# Patient Record
Sex: MALE | Race: WHITE
[De-identification: names, ages, dates, MRNs, and addresses within clinical notes are randomized per-mention and may not be internally consistent; named-entity substitution may affect disease eponyms.]

---

## 2018-07-06 ENCOUNTER — HOSPITAL ENCOUNTER (INPATIENT)
Dept: HOSPITAL 53 - M MSPAV | Age: 58
LOS: 6 days | Discharge: HOME | DRG: 224 | End: 2018-07-12
Attending: SURGERY | Admitting: SURGERY
Payer: MEDICAID

## 2018-07-06 DIAGNOSIS — J18.9: ICD-10-CM

## 2018-07-06 DIAGNOSIS — K56.51: Primary | ICD-10-CM

## 2018-07-06 DIAGNOSIS — I10: ICD-10-CM

## 2018-07-06 DIAGNOSIS — J43.9: ICD-10-CM

## 2018-07-06 DIAGNOSIS — R91.1: ICD-10-CM

## 2018-07-06 DIAGNOSIS — R04.2: ICD-10-CM

## 2018-07-06 DIAGNOSIS — F10.10: ICD-10-CM

## 2018-07-06 DIAGNOSIS — Z79.899: ICD-10-CM

## 2018-07-06 DIAGNOSIS — F17.200: ICD-10-CM

## 2018-07-06 RX ADMIN — MORPHINE SULFATE 1 MG: 4 INJECTION INTRAVENOUS at 21:04

## 2018-07-06 RX ADMIN — MORPHINE SULFATE 1 MG: 4 INJECTION INTRAVENOUS at 23:33

## 2018-07-06 RX ADMIN — SODIUM CHLORIDE, POTASSIUM CHLORIDE, SODIUM LACTATE AND CALCIUM CHLORIDE 1 MLS/HR: 600; 310; 30; 20 INJECTION, SOLUTION INTRAVENOUS at 21:04

## 2018-07-07 LAB
ANION GAP: 6 MEQ/L (ref 8–16)
BASO #: 0 10^3/UL (ref 0–0.2)
BASO %: 0.4 % (ref 0–1)
BLOOD UREA NITROGEN: 9 MG/DL (ref 7–18)
CALCIUM LEVEL: 8.7 MG/DL (ref 8.5–10.1)
CARBON DIOXIDE LEVEL: 30 MEQ/L (ref 21–32)
CHLORIDE LEVEL: 101 MEQ/L (ref 98–107)
CREATININE FOR GFR: 0.58 MG/DL (ref 0.7–1.3)
EOS #: 0.1 10^3/UL (ref 0–0.5)
EOSINOPHIL NFR BLD AUTO: 1.5 % (ref 0–3)
GFR SERPL CREATININE-BSD FRML MDRD: > 60 ML/MIN/{1.73_M2} (ref 56–?)
GLUCOSE, FASTING: 90 MG/DL (ref 70–100)
HEMATOCRIT: 44.1 % (ref 42–52)
HEMOGLOBIN: 15.2 G/DL (ref 13.5–17.5)
IMMATURE GRANULOCYTE %: 0.1 % (ref 0–3)
LACTIC ACID SEPSIS PROTOCOL: 0.8 MMOL/L (ref 0.4–2)
LYMPH #: 1.3 10^3/UL (ref 1.5–4.5)
LYMPH %: 17.7 % (ref 24–44)
MEAN CORPUSCULAR HEMOGLOBIN: 33.1 PG (ref 27–33)
MEAN CORPUSCULAR HGB CONC: 34.5 G/DL (ref 32–36.5)
MEAN CORPUSCULAR VOLUME: 96.1 FL (ref 80–96)
MONO #: 1 10^3/UL (ref 0–0.8)
MONO %: 13.7 % (ref 0–5)
NEUTROPHILS #: 5.1 10^3/UL (ref 1.8–7.7)
NEUTROPHILS %: 66.6 % (ref 36–66)
NRBC BLD AUTO-RTO: 0 % (ref 0–0)
PLATELET COUNT, AUTOMATED: 210 10^3/UL (ref 150–450)
POTASSIUM SERUM: 4 MEQ/L (ref 3.5–5.1)
RED BLOOD COUNT: 4.59 10^6/UL (ref 4.3–6.1)
RED CELL DISTRIBUTION WIDTH: 12.5 % (ref 11.5–14.5)
SODIUM LEVEL: 137 MEQ/L (ref 136–145)
WHITE BLOOD COUNT: 7.6 10^3/UL (ref 4–10)

## 2018-07-07 RX ADMIN — DEXTROMETHORPHAN 1 MG: 30 SUSPENSION, EXTENDED RELEASE ORAL at 13:45

## 2018-07-07 RX ADMIN — DEXTROSE MONOHYDRATE 1 MG: 50 INJECTION, SOLUTION INTRAVENOUS at 09:22

## 2018-07-07 RX ADMIN — MORPHINE SULFATE 1 MG: 4 INJECTION INTRAVENOUS at 11:38

## 2018-07-07 RX ADMIN — MORPHINE SULFATE 1 MG: 4 INJECTION INTRAVENOUS at 04:50

## 2018-07-07 RX ADMIN — SODIUM CHLORIDE, POTASSIUM CHLORIDE, SODIUM LACTATE AND CALCIUM CHLORIDE 1 MLS/HR: 600; 310; 30; 20 INJECTION, SOLUTION INTRAVENOUS at 20:08

## 2018-07-07 RX ADMIN — HYDROCODONE BITARTRATE AND ACETAMINOPHEN 1 TAB: 5; 325 TABLET ORAL at 12:38

## 2018-07-07 RX ADMIN — DEXTROMETHORPHAN 1 MG: 30 SUSPENSION, EXTENDED RELEASE ORAL at 20:09

## 2018-07-07 RX ADMIN — SODIUM CHLORIDE, POTASSIUM CHLORIDE, SODIUM LACTATE AND CALCIUM CHLORIDE 1 MLS/HR: 600; 310; 30; 20 INJECTION, SOLUTION INTRAVENOUS at 04:49

## 2018-07-07 RX ADMIN — MORPHINE SULFATE 1 MG: 4 INJECTION INTRAVENOUS at 15:59

## 2018-07-07 RX ADMIN — MORPHINE SULFATE 1 MG: 4 INJECTION INTRAVENOUS at 20:09

## 2018-07-07 RX ADMIN — CLONIDINE 1 EA: 0.2 PATCH TRANSDERMAL at 01:53

## 2018-07-07 RX ADMIN — MORPHINE SULFATE 1 MG: 4 INJECTION INTRAVENOUS at 06:57

## 2018-07-07 RX ADMIN — SODIUM CHLORIDE, POTASSIUM CHLORIDE, SODIUM LACTATE AND CALCIUM CHLORIDE 1 MLS/HR: 600; 310; 30; 20 INJECTION, SOLUTION INTRAVENOUS at 14:12

## 2018-07-07 RX ADMIN — BISACODYL 1 MG: 10 SUPPOSITORY RECTAL at 12:37

## 2018-07-07 RX ADMIN — HYDROCODONE BITARTRATE AND ACETAMINOPHEN 1 TAB: 5; 325 TABLET ORAL at 01:52

## 2018-07-07 RX ADMIN — POLYETHYLENE GLYCOL 3350 1 PKT: 17 POWDER, FOR SOLUTION ORAL at 12:37

## 2018-07-07 RX ADMIN — ENOXAPARIN SODIUM 1 MG: 40 INJECTION SUBCUTANEOUS at 09:24

## 2018-07-08 RX ADMIN — HYDROCODONE BITARTRATE AND ACETAMINOPHEN 1 TAB: 5; 325 TABLET ORAL at 20:32

## 2018-07-08 RX ADMIN — DEXTROMETHORPHAN 1 MG: 30 SUSPENSION, EXTENDED RELEASE ORAL at 08:45

## 2018-07-08 RX ADMIN — MORPHINE SULFATE 1 MG: 4 INJECTION INTRAVENOUS at 11:21

## 2018-07-08 RX ADMIN — HYDROCODONE BITARTRATE AND ACETAMINOPHEN 1 TAB: 5; 325 TABLET ORAL at 13:56

## 2018-07-08 RX ADMIN — MORPHINE SULFATE 1 MG: 4 INJECTION INTRAVENOUS at 16:26

## 2018-07-08 RX ADMIN — DEXTROSE MONOHYDRATE 1 MG: 50 INJECTION, SOLUTION INTRAVENOUS at 08:49

## 2018-07-08 RX ADMIN — MORPHINE SULFATE 1 MG: 4 INJECTION INTRAVENOUS at 08:44

## 2018-07-08 RX ADMIN — SODIUM CHLORIDE, POTASSIUM CHLORIDE, SODIUM LACTATE AND CALCIUM CHLORIDE 1 MLS/HR: 600; 310; 30; 20 INJECTION, SOLUTION INTRAVENOUS at 21:48

## 2018-07-08 RX ADMIN — ENOXAPARIN SODIUM 1 MG: 40 INJECTION SUBCUTANEOUS at 08:50

## 2018-07-08 RX ADMIN — SODIUM CHLORIDE, POTASSIUM CHLORIDE, SODIUM LACTATE AND CALCIUM CHLORIDE 1 MLS/HR: 600; 310; 30; 20 INJECTION, SOLUTION INTRAVENOUS at 05:17

## 2018-07-08 RX ADMIN — SODIUM CHLORIDE, POTASSIUM CHLORIDE, SODIUM LACTATE AND CALCIUM CHLORIDE 1 MLS/HR: 600; 310; 30; 20 INJECTION, SOLUTION INTRAVENOUS at 13:48

## 2018-07-08 RX ADMIN — HYDROCODONE BITARTRATE AND ACETAMINOPHEN 1 TAB: 5; 325 TABLET ORAL at 05:17

## 2018-07-08 RX ADMIN — DEXTROMETHORPHAN 1 MG: 30 SUSPENSION, EXTENDED RELEASE ORAL at 20:30

## 2018-07-09 LAB
ANION GAP: 11 MEQ/L (ref 8–16)
BASO #: 0 10^3/UL (ref 0–0.2)
BASO %: 0.4 % (ref 0–1)
BLOOD UREA NITROGEN: 8 MG/DL (ref 7–18)
CALCIUM LEVEL: 8.9 MG/DL (ref 8.5–10.1)
CARBON DIOXIDE LEVEL: 29 MEQ/L (ref 21–32)
CHLORIDE LEVEL: 94 MEQ/L (ref 98–107)
CREATININE FOR GFR: 0.52 MG/DL (ref 0.7–1.3)
EOS #: 0.1 10^3/UL (ref 0–0.5)
EOSINOPHIL NFR BLD AUTO: 1.5 % (ref 0–3)
GFR SERPL CREATININE-BSD FRML MDRD: > 60 ML/MIN/{1.73_M2} (ref 56–?)
GLUCOSE BLDC GLUCOMTR-MCNC: 92 MG/DL (ref 70–105)
GLUCOSE BLDC GLUCOMTR-MCNC: 96 MG/DL (ref 70–105)
GLUCOSE, FASTING: 60 MG/DL (ref 70–100)
HEMATOCRIT: 42.5 % (ref 42–52)
HEMOGLOBIN: 14.9 G/DL (ref 13.5–17.5)
IMMATURE GRANULOCYTE %: 0.1 % (ref 0–3)
LYMPH #: 1 10^3/UL (ref 1.5–4.5)
LYMPH %: 14.7 % (ref 24–44)
MEAN CORPUSCULAR HEMOGLOBIN: 33 PG (ref 27–33)
MEAN CORPUSCULAR HGB CONC: 35.1 G/DL (ref 32–36.5)
MEAN CORPUSCULAR VOLUME: 94.2 FL (ref 80–96)
MONO #: 1.1 10^3/UL (ref 0–0.8)
MONO %: 16.3 % (ref 0–5)
NEUTROPHILS #: 4.5 10^3/UL (ref 1.8–7.7)
NEUTROPHILS %: 67 % (ref 36–66)
NRBC BLD AUTO-RTO: 0 % (ref 0–0)
PLATELET COUNT, AUTOMATED: 226 10^3/UL (ref 150–450)
POTASSIUM SERUM: 3.6 MEQ/L (ref 3.5–5.1)
RED BLOOD COUNT: 4.51 10^6/UL (ref 4.3–6.1)
RED CELL DISTRIBUTION WIDTH: 12.1 % (ref 11.5–14.5)
SODIUM LEVEL: 134 MEQ/L (ref 136–145)
WHITE BLOOD COUNT: 6.7 10^3/UL (ref 4–10)

## 2018-07-09 PROCEDURE — 0DN84ZZ RELEASE SMALL INTESTINE, PERCUTANEOUS ENDOSCOPIC APPROACH: ICD-10-PCS

## 2018-07-09 RX ADMIN — BUPIVACAINE HYDROCHLORIDE 1 ML: 2.5 INJECTION, SOLUTION EPIDURAL; INFILTRATION; INTRACAUDAL at 17:05

## 2018-07-09 RX ADMIN — POTASSIUM CHLORIDE, DEXTROSE MONOHYDRATE AND SODIUM CHLORIDE 1 MLS/HR: 150; 5; 450 INJECTION, SOLUTION INTRAVENOUS at 09:21

## 2018-07-09 RX ADMIN — DEXTROSE MONOHYDRATE 1 MG: 50 INJECTION, SOLUTION INTRAVENOUS at 09:16

## 2018-07-09 RX ADMIN — FENTANYL CITRATE 1 MCG: 50 INJECTION, SOLUTION INTRAMUSCULAR; INTRAVENOUS at 19:02

## 2018-07-09 RX ADMIN — ENOXAPARIN SODIUM 1 MG: 40 INJECTION SUBCUTANEOUS at 10:19

## 2018-07-09 RX ADMIN — FENTANYL CITRATE 1 MCG: 50 INJECTION, SOLUTION INTRAMUSCULAR; INTRAVENOUS at 19:11

## 2018-07-09 RX ADMIN — MORPHINE SULFATE 1 MG: 10 INJECTION INTRAVENOUS at 19:25

## 2018-07-09 RX ADMIN — SODIUM CHLORIDE, POTASSIUM CHLORIDE, SODIUM LACTATE AND CALCIUM CHLORIDE 1 MLS/HR: 600; 310; 30; 20 INJECTION, SOLUTION INTRAVENOUS at 06:11

## 2018-07-09 RX ADMIN — SODIUM CHLORIDE, POTASSIUM CHLORIDE, SODIUM LACTATE AND CALCIUM CHLORIDE 1 MLS/HR: 600; 310; 30; 20 INJECTION, SOLUTION INTRAVENOUS at 02:20

## 2018-07-09 RX ADMIN — HYDROCODONE BITARTRATE AND ACETAMINOPHEN 1 TAB: 5; 325 TABLET ORAL at 06:18

## 2018-07-09 RX ADMIN — POTASSIUM CHLORIDE, DEXTROSE MONOHYDRATE AND SODIUM CHLORIDE 1 MLS/HR: 150; 5; 450 INJECTION, SOLUTION INTRAVENOUS at 20:20

## 2018-07-09 RX ADMIN — DEXTROMETHORPHAN 1 MG: 30 SUSPENSION, EXTENDED RELEASE ORAL at 09:15

## 2018-07-09 RX ADMIN — DEXTROMETHORPHAN 1 MG: 30 SUSPENSION, EXTENDED RELEASE ORAL at 21:37

## 2018-07-09 RX ADMIN — MORPHINE SULFATE 1 MG: 10 INJECTION INTRAVENOUS at 19:35

## 2018-07-09 RX ADMIN — FENTANYL CITRATE 1 MCG: 50 INJECTION, SOLUTION INTRAMUSCULAR; INTRAVENOUS at 18:50

## 2018-07-09 RX ADMIN — HYDROCODONE BITARTRATE AND ACETAMINOPHEN 1 TAB: 5; 325 TABLET ORAL at 11:22

## 2018-07-09 RX ADMIN — SODIUM CHLORIDE, POTASSIUM CHLORIDE, SODIUM LACTATE AND CALCIUM CHLORIDE 1 MLS/HR: 600; 310; 30; 20 INJECTION, SOLUTION INTRAVENOUS at 19:00

## 2018-07-09 RX ADMIN — CEFAZOLIN SODIUM 1 GM: 2 SOLUTION INTRAVENOUS at 16:59

## 2018-07-09 RX ADMIN — LIDOCAINE HYDROCHLORIDE 1 ML: 10 INJECTION, SOLUTION EPIDURAL; INFILTRATION; INTRACAUDAL; PERINEURAL at 17:05

## 2018-07-10 LAB
ANION GAP: 6 MEQ/L (ref 8–16)
BASO #: 0 10^3/UL (ref 0–0.2)
BASO %: 0.2 % (ref 0–1)
BLOOD UREA NITROGEN: 9 MG/DL (ref 7–18)
CALCIUM LEVEL: 8.6 MG/DL (ref 8.5–10.1)
CARBON DIOXIDE LEVEL: 31 MEQ/L (ref 21–32)
CHLORIDE LEVEL: 98 MEQ/L (ref 98–107)
CREATININE FOR GFR: 0.65 MG/DL (ref 0.7–1.3)
EOS #: 0 10^3/UL (ref 0–0.5)
EOSINOPHIL NFR BLD AUTO: 0.2 % (ref 0–3)
GFR SERPL CREATININE-BSD FRML MDRD: > 60 ML/MIN/{1.73_M2} (ref 56–?)
GLUCOSE BLDC GLUCOMTR-MCNC: 117 MG/DL (ref 70–105)
GLUCOSE BLDC GLUCOMTR-MCNC: 135 MG/DL (ref 70–105)
GLUCOSE BLDC GLUCOMTR-MCNC: 154 MG/DL (ref 70–105)
GLUCOSE, FASTING: 151 MG/DL (ref 70–100)
HEMATOCRIT: 43.5 % (ref 42–52)
HEMOGLOBIN: 15.3 G/DL (ref 13.5–17.5)
IMMATURE GRANULOCYTE %: 0.4 % (ref 0–3)
LYMPH #: 0.6 10^3/UL (ref 1.5–4.5)
LYMPH %: 11.7 % (ref 24–44)
MEAN CORPUSCULAR HEMOGLOBIN: 32.8 PG (ref 27–33)
MEAN CORPUSCULAR HGB CONC: 35.2 G/DL (ref 32–36.5)
MEAN CORPUSCULAR VOLUME: 93.1 FL (ref 80–96)
MONO #: 0.7 10^3/UL (ref 0–0.8)
MONO %: 14.2 % (ref 0–5)
NEUTROPHILS #: 3.8 10^3/UL (ref 1.8–7.7)
NEUTROPHILS %: 73.3 % (ref 36–66)
NRBC BLD AUTO-RTO: 0 % (ref 0–0)
PLATELET COUNT, AUTOMATED: 236 10^3/UL (ref 150–450)
POTASSIUM SERUM: 4.5 MEQ/L (ref 3.5–5.1)
RED BLOOD COUNT: 4.67 10^6/UL (ref 4.3–6.1)
RED CELL DISTRIBUTION WIDTH: 11.9 % (ref 11.5–14.5)
SODIUM LEVEL: 135 MEQ/L (ref 136–145)
WHITE BLOOD COUNT: 5.2 10^3/UL (ref 4–10)

## 2018-07-10 RX ADMIN — DEXTROMETHORPHAN 1 MG: 30 SUSPENSION, EXTENDED RELEASE ORAL at 20:23

## 2018-07-10 RX ADMIN — IPRATROPIUM BROMIDE AND ALBUTEROL SULFATE 1 ML: .5; 3 SOLUTION RESPIRATORY (INHALATION) at 15:43

## 2018-07-10 RX ADMIN — HYDROCODONE BITARTRATE AND ACETAMINOPHEN 1 TAB: 5; 325 TABLET ORAL at 20:23

## 2018-07-10 RX ADMIN — DEXTROSE MONOHYDRATE 1 MG: 50 INJECTION, SOLUTION INTRAVENOUS at 09:46

## 2018-07-10 RX ADMIN — HYDROCODONE BITARTRATE AND ACETAMINOPHEN 1 TAB: 5; 325 TABLET ORAL at 10:54

## 2018-07-10 RX ADMIN — ENOXAPARIN SODIUM 1 MG: 40 INJECTION SUBCUTANEOUS at 09:46

## 2018-07-10 RX ADMIN — HYDROCODONE BITARTRATE AND ACETAMINOPHEN 1 TAB: 5; 325 TABLET ORAL at 17:50

## 2018-07-10 RX ADMIN — MORPHINE SULFATE 1 MG: 4 INJECTION INTRAVENOUS at 00:09

## 2018-07-10 RX ADMIN — POTASSIUM CHLORIDE, DEXTROSE MONOHYDRATE AND SODIUM CHLORIDE 1 MLS/HR: 150; 5; 450 INJECTION, SOLUTION INTRAVENOUS at 20:23

## 2018-07-10 RX ADMIN — DEXTROMETHORPHAN 1 MG: 30 SUSPENSION, EXTENDED RELEASE ORAL at 12:19

## 2018-07-10 RX ADMIN — IPRATROPIUM BROMIDE AND ALBUTEROL SULFATE 1 ML: .5; 3 SOLUTION RESPIRATORY (INHALATION) at 20:47

## 2018-07-10 RX ADMIN — POTASSIUM CHLORIDE, DEXTROSE MONOHYDRATE AND SODIUM CHLORIDE 1 MLS/HR: 150; 5; 450 INJECTION, SOLUTION INTRAVENOUS at 05:51

## 2018-07-11 LAB
ANION GAP: 0 MEQ/L (ref 8–16)
BASO #: 0 10^3/UL (ref 0–0.2)
BASO %: 0.3 % (ref 0–1)
BLOOD UREA NITROGEN: 5 MG/DL (ref 7–18)
CALCIUM LEVEL: 8.4 MG/DL (ref 8.5–10.1)
CARBON DIOXIDE LEVEL: 33 MEQ/L (ref 21–32)
CHLORIDE LEVEL: 106 MEQ/L (ref 98–107)
CREATININE FOR GFR: 0.64 MG/DL (ref 0.7–1.3)
EOS #: 0.1 10^3/UL (ref 0–0.5)
EOSINOPHIL NFR BLD AUTO: 2.1 % (ref 0–3)
GFR SERPL CREATININE-BSD FRML MDRD: > 60 ML/MIN/{1.73_M2} (ref 56–?)
GLUCOSE BLDC GLUCOMTR-MCNC: 103 MG/DL (ref 70–105)
GLUCOSE, FASTING: 112 MG/DL (ref 70–100)
HEMATOCRIT: 40.5 % (ref 42–52)
HEMOGLOBIN: 14.1 G/DL (ref 13.5–17.5)
IMMATURE GRANULOCYTE %: 0.3 % (ref 0–3)
LYMPH #: 2.2 10^3/UL (ref 1.5–4.5)
LYMPH %: 32.8 % (ref 24–44)
MEAN CORPUSCULAR HEMOGLOBIN: 32.9 PG (ref 27–33)
MEAN CORPUSCULAR HGB CONC: 34.8 G/DL (ref 32–36.5)
MEAN CORPUSCULAR VOLUME: 94.6 FL (ref 80–96)
MONO #: 1.1 10^3/UL (ref 0–0.8)
MONO %: 16.9 % (ref 0–5)
NEUTROPHILS #: 3.1 10^3/UL (ref 1.8–7.7)
NEUTROPHILS %: 47.6 % (ref 36–66)
NRBC BLD AUTO-RTO: 0 % (ref 0–0)
PLATELET COUNT, AUTOMATED: 259 10^3/UL (ref 150–450)
POTASSIUM SERUM: 4.7 MEQ/L (ref 3.5–5.1)
RED BLOOD COUNT: 4.28 10^6/UL (ref 4.3–6.1)
RED CELL DISTRIBUTION WIDTH: 12.2 % (ref 11.5–14.5)
SODIUM LEVEL: 139 MEQ/L (ref 136–145)
WHITE BLOOD COUNT: 6.6 10^3/UL (ref 4–10)

## 2018-07-11 RX ADMIN — ENOXAPARIN SODIUM 1 MG: 40 INJECTION SUBCUTANEOUS at 08:21

## 2018-07-11 RX ADMIN — MORPHINE SULFATE 1 MG: 4 INJECTION INTRAVENOUS at 00:12

## 2018-07-11 RX ADMIN — NICOTINE 1 PATCH: 21 PATCH, EXTENDED RELEASE TRANSDERMAL at 17:10

## 2018-07-11 RX ADMIN — DEXTROMETHORPHAN 1 MG: 30 SUSPENSION, EXTENDED RELEASE ORAL at 20:56

## 2018-07-11 RX ADMIN — HYDROCODONE BITARTRATE AND ACETAMINOPHEN 1 TAB: 5; 325 TABLET ORAL at 21:37

## 2018-07-11 RX ADMIN — MAGNESIUM HYDROXIDE 1 ML: 400 SUSPENSION ORAL at 08:20

## 2018-07-11 RX ADMIN — LISINOPRIL 1 MG: 10 TABLET ORAL at 20:12

## 2018-07-11 RX ADMIN — DEXTROMETHORPHAN 1 MG: 30 SUSPENSION, EXTENDED RELEASE ORAL at 08:22

## 2018-07-11 RX ADMIN — HYDROCODONE BITARTRATE AND ACETAMINOPHEN 1 TAB: 5; 325 TABLET ORAL at 17:10

## 2018-07-11 RX ADMIN — LISINOPRIL 1 MG: 10 TABLET ORAL at 11:28

## 2018-07-11 RX ADMIN — DEXTROSE MONOHYDRATE 1 MG: 50 INJECTION, SOLUTION INTRAVENOUS at 08:21

## 2018-07-11 RX ADMIN — HYDROCODONE BITARTRATE AND ACETAMINOPHEN 1 TAB: 5; 325 TABLET ORAL at 09:55

## 2018-07-12 LAB
ANION GAP: 7 MEQ/L (ref 8–16)
BASO #: 0 10^3/UL (ref 0–0.2)
BASO %: 0.3 % (ref 0–1)
BLOOD UREA NITROGEN: 9 MG/DL (ref 7–18)
CALCIUM LEVEL: 8.3 MG/DL (ref 8.5–10.1)
CARBON DIOXIDE LEVEL: 30 MEQ/L (ref 21–32)
CHLORIDE LEVEL: 102 MEQ/L (ref 98–107)
CREATININE FOR GFR: 0.59 MG/DL (ref 0.7–1.3)
EOS #: 0.1 10^3/UL (ref 0–0.5)
EOSINOPHIL NFR BLD AUTO: 2.1 % (ref 0–3)
GFR SERPL CREATININE-BSD FRML MDRD: > 60 ML/MIN/{1.73_M2} (ref 56–?)
GLUCOSE, FASTING: 85 MG/DL (ref 70–100)
HEMATOCRIT: 46.1 % (ref 42–52)
HEMOGLOBIN: 15.5 G/DL (ref 13.5–17.5)
IMMATURE GRANULOCYTE %: 0.3 % (ref 0–3)
LYMPH #: 0.8 10^3/UL (ref 1.5–4.5)
LYMPH %: 12.7 % (ref 24–44)
MEAN CORPUSCULAR HEMOGLOBIN: 33 PG (ref 27–33)
MEAN CORPUSCULAR HGB CONC: 33.6 G/DL (ref 32–36.5)
MEAN CORPUSCULAR VOLUME: 98.1 FL (ref 80–96)
MONO #: 0.7 10^3/UL (ref 0–0.8)
MONO %: 10.9 % (ref 0–5)
NEUTROPHILS #: 4.6 10^3/UL (ref 1.8–7.7)
NEUTROPHILS %: 73.7 % (ref 36–66)
NRBC BLD AUTO-RTO: 0 % (ref 0–0)
PLATELET COUNT, AUTOMATED: 255 10^3/UL (ref 150–450)
POTASSIUM SERUM: 3.9 MEQ/L (ref 3.5–5.1)
RED BLOOD COUNT: 4.7 10^6/UL (ref 4.3–6.1)
RED CELL DISTRIBUTION WIDTH: 12.4 % (ref 11.5–14.5)
SODIUM LEVEL: 139 MEQ/L (ref 136–145)
WHITE BLOOD COUNT: 6.2 10^3/UL (ref 4–10)

## 2018-07-12 RX ADMIN — HYDROCODONE BITARTRATE AND ACETAMINOPHEN 1 TAB: 5; 325 TABLET ORAL at 01:44

## 2018-07-12 RX ADMIN — DEXTROMETHORPHAN 1 MG: 30 SUSPENSION, EXTENDED RELEASE ORAL at 08:56

## 2018-07-12 RX ADMIN — LISINOPRIL 1 MG: 20 TABLET ORAL at 08:57

## 2018-07-12 RX ADMIN — ENOXAPARIN SODIUM 1 MG: 40 INJECTION SUBCUTANEOUS at 08:57

## 2018-07-12 RX ADMIN — NICOTINE 1 PATCH: 21 PATCH, EXTENDED RELEASE TRANSDERMAL at 08:56

## 2018-07-31 ENCOUNTER — HOSPITAL ENCOUNTER (OUTPATIENT)
Dept: HOSPITAL 53 - M SDC | Age: 58
Discharge: HOME | End: 2018-07-31
Attending: INTERNAL MEDICINE
Payer: MEDICAID

## 2018-07-31 DIAGNOSIS — J44.9: ICD-10-CM

## 2018-07-31 DIAGNOSIS — I10: ICD-10-CM

## 2018-07-31 DIAGNOSIS — R91.8: ICD-10-CM

## 2018-07-31 DIAGNOSIS — Z79.899: ICD-10-CM

## 2018-07-31 DIAGNOSIS — F17.210: ICD-10-CM

## 2018-07-31 DIAGNOSIS — R04.2: ICD-10-CM

## 2018-07-31 DIAGNOSIS — C34.11: Primary | ICD-10-CM

## 2018-07-31 PROCEDURE — 31623 DX BRONCHOSCOPE/BRUSH: CPT

## 2018-07-31 RX ADMIN — BENZOCAINE, BUTAMBEN, AND TETRACAINE HYDROCHLORIDE 1 SPRAY: .028; .004; .004 AEROSOL, SPRAY TOPICAL at 08:00

## 2018-07-31 RX ADMIN — LIDOCAINE HYDROCHLORIDE 1 ML: 10 INJECTION, SOLUTION INFILTRATION; PERINEURAL at 08:00

## 2018-07-31 RX ADMIN — LIDOCAINE HYDROCHLORIDE 1 ML: 20 SOLUTION ORAL; TOPICAL at 08:00

## 2018-07-31 RX ADMIN — THROMBIN, TOPICAL (BOVINE) 1 UNITS: KIT at 08:00

## 2018-07-31 RX ADMIN — DEXTROSE MONOHYDRATE 1 MG: 50 INJECTION, SOLUTION INTRAVENOUS at 08:00

## 2018-07-31 RX ADMIN — PHENYLEPHRINE HYDROCHLORIDE 1 SPRAY: 0.5 SPRAY NASAL at 08:43

## 2018-08-09 ENCOUNTER — HOSPITAL ENCOUNTER (OUTPATIENT)
Dept: HOSPITAL 53 - M CARPUL | Age: 58
End: 2018-08-09
Attending: INTERNAL MEDICINE
Payer: MEDICAID

## 2018-08-09 DIAGNOSIS — C34.11: Primary | ICD-10-CM

## 2018-08-09 PROCEDURE — 94060 EVALUATION OF WHEEZING: CPT

## 2018-08-21 ENCOUNTER — HOSPITAL ENCOUNTER (OUTPATIENT)
Dept: HOSPITAL 53 - M PLARAD | Age: 58
End: 2018-08-21
Attending: INTERNAL MEDICINE
Payer: COMMERCIAL

## 2018-08-21 DIAGNOSIS — C34.91: Primary | ICD-10-CM

## 2018-08-21 PROCEDURE — 78815 PET IMAGE W/CT SKULL-THIGH: CPT

## 2018-09-05 ENCOUNTER — HOSPITAL ENCOUNTER (OUTPATIENT)
Dept: HOSPITAL 53 - M LAB REF | Age: 58
End: 2018-09-05
Attending: INTERNAL MEDICINE
Payer: COMMERCIAL

## 2018-09-05 DIAGNOSIS — C34.90: Primary | ICD-10-CM

## 2018-09-05 LAB
INR: 0.9
PARTIAL THROMBOPLASTIN TIME: 32.3 SECONDS (ref 25.4–37.6)
PROTHROMBIN TIME: 12.3 SECONDS (ref 12.1–14.4)

## 2018-09-05 PROCEDURE — 85610 PROTHROMBIN TIME: CPT

## 2018-09-12 ENCOUNTER — HOSPITAL ENCOUNTER (OUTPATIENT)
Dept: HOSPITAL 53 - M RAD | Age: 58
End: 2018-09-12
Attending: INTERNAL MEDICINE
Payer: COMMERCIAL

## 2018-09-12 DIAGNOSIS — C34.90: Primary | ICD-10-CM

## 2018-09-14 ENCOUNTER — HOSPITAL ENCOUNTER (OUTPATIENT)
Dept: HOSPITAL 53 - M ONCR | Age: 58
End: 2018-09-14
Attending: RADIOLOGY
Payer: COMMERCIAL

## 2018-09-14 DIAGNOSIS — C34.11: Primary | ICD-10-CM

## 2018-09-14 PROCEDURE — 99201: CPT

## 2018-09-21 ENCOUNTER — HOSPITAL ENCOUNTER (OUTPATIENT)
Dept: HOSPITAL 53 - M SDC | Age: 58
Discharge: HOME | End: 2018-09-21
Attending: THORACIC SURGERY (CARDIOTHORACIC VASCULAR SURGERY)
Payer: COMMERCIAL

## 2018-09-21 DIAGNOSIS — I10: ICD-10-CM

## 2018-09-21 DIAGNOSIS — Z45.2: ICD-10-CM

## 2018-09-21 DIAGNOSIS — Z79.899: ICD-10-CM

## 2018-09-21 DIAGNOSIS — C34.11: Primary | ICD-10-CM

## 2018-09-21 DIAGNOSIS — F17.218: ICD-10-CM

## 2018-09-21 DIAGNOSIS — R04.2: ICD-10-CM

## 2018-09-21 DIAGNOSIS — J44.9: ICD-10-CM

## 2018-09-21 PROCEDURE — 36561 INSERT TUNNELED CV CATH: CPT

## 2018-09-21 RX ADMIN — MUPIROCIN 1 DOSE: 20 OINTMENT TOPICAL at 13:25

## 2018-09-21 RX ADMIN — LIDOCAINE HYDROCHLORIDE 1 ML: 10 INJECTION, SOLUTION INFILTRATION; PERINEURAL at 13:38

## 2018-09-21 RX ADMIN — Medication 1 UNITS: at 13:50

## 2018-09-21 RX ADMIN — SODIUM CHLORIDE 1 UNITS: 4.5 INJECTION, SOLUTION INTRAVENOUS at 13:40

## 2018-09-21 RX ADMIN — BUPIVACAINE 1 ML: 13.3 INJECTION, SUSPENSION, LIPOSOMAL INFILTRATION at 13:58

## 2018-09-26 ENCOUNTER — HOSPITAL ENCOUNTER (OUTPATIENT)
Dept: HOSPITAL 53 - M ONCR | Age: 58
LOS: 4 days | End: 2018-09-30
Attending: RADIOLOGY
Payer: COMMERCIAL

## 2018-09-26 DIAGNOSIS — C34.01: Primary | ICD-10-CM

## 2018-09-26 PROCEDURE — 77334 RADIATION TREATMENT AID(S): CPT

## 2018-09-27 ENCOUNTER — HOSPITAL ENCOUNTER (OUTPATIENT)
Dept: HOSPITAL 53 - M SMT | Age: 58
End: 2018-09-27
Attending: THORACIC SURGERY (CARDIOTHORACIC VASCULAR SURGERY)
Payer: COMMERCIAL

## 2018-09-27 ENCOUNTER — HOSPITAL ENCOUNTER (OUTPATIENT)
Dept: HOSPITAL 53 - M LAB REF | Age: 58
End: 2018-09-27
Attending: PATHOLOGY
Payer: COMMERCIAL

## 2018-09-27 DIAGNOSIS — C34.90: Primary | ICD-10-CM

## 2018-09-27 PROCEDURE — 71046 X-RAY EXAM CHEST 2 VIEWS: CPT

## 2018-09-27 PROCEDURE — 88300 SURGICAL PATH GROSS: CPT

## 2018-09-30 ENCOUNTER — HOSPITAL ENCOUNTER (INPATIENT)
Dept: HOSPITAL 53 - M ICU | Age: 58
LOS: 3 days | Discharge: HOME | DRG: 136 | End: 2018-10-03
Attending: HOSPITALIST | Admitting: HOSPITALIST
Payer: COMMERCIAL

## 2018-09-30 DIAGNOSIS — J44.9: ICD-10-CM

## 2018-09-30 DIAGNOSIS — R78.81: ICD-10-CM

## 2018-09-30 DIAGNOSIS — R04.2: ICD-10-CM

## 2018-09-30 DIAGNOSIS — Z79.899: ICD-10-CM

## 2018-09-30 DIAGNOSIS — I10: ICD-10-CM

## 2018-09-30 DIAGNOSIS — C34.90: Primary | ICD-10-CM

## 2018-09-30 DIAGNOSIS — Z66: ICD-10-CM

## 2018-09-30 DIAGNOSIS — F17.200: ICD-10-CM

## 2018-09-30 LAB
INR: 0.98
PROTHROMBIN TIME: 13.1 SECONDS (ref 12.1–14.4)
TROPONIN I: < 0.02 NG/ML (ref ?–0.1)

## 2018-09-30 RX ADMIN — METHYLPREDNISOLONE SODIUM SUCCINATE 1 MG: 125 INJECTION, POWDER, FOR SOLUTION INTRAMUSCULAR; INTRAVENOUS at 22:33

## 2018-09-30 RX ADMIN — MORPHINE SULFATE 1 MG: 4 INJECTION INTRAVENOUS at 21:41

## 2018-09-30 RX ADMIN — DOCUSATE SODIUM 1 MG: 100 CAPSULE, LIQUID FILLED ORAL at 21:00

## 2018-09-30 RX ADMIN — DEXTROSE MONOHYDRATE 1 MLS/HR: 50 INJECTION, SOLUTION INTRAVENOUS at 22:32

## 2018-09-30 RX ADMIN — SODIUM CHLORIDE 1 MLS/HR: 9 INJECTION, SOLUTION INTRAVENOUS at 21:26

## 2018-09-30 RX ADMIN — IPRATROPIUM BROMIDE AND ALBUTEROL SULFATE 1 ML: .5; 3 SOLUTION RESPIRATORY (INHALATION) at 21:53

## 2018-10-01 LAB
CREATININE FOR GFR: 0.55 MG/DL (ref 0.7–1.3)
GFR SERPL CREATININE-BSD FRML MDRD: > 60 ML/MIN/{1.73_M2} (ref 56–?)
HEMATOCRIT: 39.3 % (ref 42–52)
HEMOGLOBIN: 13.3 G/DL (ref 13.5–17.5)
INR: 0.96
MAGNESIUM LEVEL: 2.2 MG/DL (ref 1.8–2.4)
MEAN CORPUSCULAR HEMOGLOBIN: 32.5 PG (ref 27–33)
MEAN CORPUSCULAR HGB CONC: 33.8 G/DL (ref 32–36.5)
MEAN CORPUSCULAR VOLUME: 96.1 FL (ref 80–96)
NRBC BLD AUTO-RTO: 0 % (ref 0–0)
PLATELET COUNT, AUTOMATED: 342 10^3/UL (ref 150–450)
PROTHROMBIN TIME: 12.9 SECONDS (ref 12.1–14.4)
RED BLOOD COUNT: 4.09 10^6/UL (ref 4.3–6.1)
RED CELL DISTRIBUTION WIDTH: 13.2 % (ref 11.5–14.5)
WHITE BLOOD COUNT: 6 10^3/UL (ref 4–10)

## 2018-10-01 RX ADMIN — IPRATROPIUM BROMIDE AND ALBUTEROL SULFATE 1 ML: .5; 3 SOLUTION RESPIRATORY (INHALATION) at 11:33

## 2018-10-01 RX ADMIN — METHYLPREDNISOLONE SODIUM SUCCINATE 1 MG: 125 INJECTION, POWDER, FOR SOLUTION INTRAMUSCULAR; INTRAVENOUS at 14:24

## 2018-10-01 RX ADMIN — IPRATROPIUM BROMIDE AND ALBUTEROL SULFATE 1 ML: .5; 3 SOLUTION RESPIRATORY (INHALATION) at 00:00

## 2018-10-01 RX ADMIN — DEXTROSE MONOHYDRATE 1 MLS/HR: 50 INJECTION, SOLUTION INTRAVENOUS at 04:02

## 2018-10-01 RX ADMIN — IPRATROPIUM BROMIDE AND ALBUTEROL SULFATE 1 ML: .5; 3 SOLUTION RESPIRATORY (INHALATION) at 20:02

## 2018-10-01 RX ADMIN — DOCUSATE SODIUM 1 MG: 100 CAPSULE, LIQUID FILLED ORAL at 20:08

## 2018-10-01 RX ADMIN — IPRATROPIUM BROMIDE AND ALBUTEROL SULFATE 1 ML: .5; 3 SOLUTION RESPIRATORY (INHALATION) at 08:00

## 2018-10-01 RX ADMIN — IPRATROPIUM BROMIDE AND ALBUTEROL SULFATE 1 ML: .5; 3 SOLUTION RESPIRATORY (INHALATION) at 04:00

## 2018-10-01 RX ADMIN — CEFEPIME HYDROCHLORIDE 1 MLS/HR: 1 INJECTION, POWDER, FOR SOLUTION INTRAMUSCULAR; INTRAVENOUS at 00:09

## 2018-10-01 RX ADMIN — DOCUSATE SODIUM 1 MG: 100 CAPSULE, LIQUID FILLED ORAL at 08:47

## 2018-10-01 RX ADMIN — METHYLPREDNISOLONE SODIUM SUCCINATE 1 MG: 125 INJECTION, POWDER, FOR SOLUTION INTRAMUSCULAR; INTRAVENOUS at 05:29

## 2018-10-01 RX ADMIN — LEVOFLOXACIN 1 MLS/HR: 5 INJECTION, SOLUTION INTRAVENOUS at 10:40

## 2018-10-01 RX ADMIN — IPRATROPIUM BROMIDE AND ALBUTEROL SULFATE 1 ML: .5; 3 SOLUTION RESPIRATORY (INHALATION) at 15:51

## 2018-10-01 RX ADMIN — LISINOPRIL 1 MG: 20 TABLET ORAL at 20:08

## 2018-10-01 RX ADMIN — CEFEPIME HYDROCHLORIDE 1 MLS/HR: 1 INJECTION, POWDER, FOR SOLUTION INTRAMUSCULAR; INTRAVENOUS at 14:24

## 2018-10-01 RX ADMIN — MORPHINE SULFATE 1 MG: 4 INJECTION INTRAVENOUS at 04:16

## 2018-10-01 RX ADMIN — SODIUM CHLORIDE 1 MLS/HR: 9 INJECTION, SOLUTION INTRAVENOUS at 08:45

## 2018-10-01 RX ADMIN — MORPHINE SULFATE 1 MG: 4 INJECTION INTRAVENOUS at 21:14

## 2018-10-02 LAB
HEMATOCRIT: 35.6 % (ref 42–52)
HEMOGLOBIN: 12.2 G/DL (ref 13.5–17.5)
INR: 0.98
MAGNESIUM LEVEL: 2.4 MG/DL (ref 1.8–2.4)
MEAN CORPUSCULAR HEMOGLOBIN: 32.7 PG (ref 27–33)
MEAN CORPUSCULAR HGB CONC: 34.3 G/DL (ref 32–36.5)
MEAN CORPUSCULAR VOLUME: 95.4 FL (ref 80–96)
NRBC BLD AUTO-RTO: 0 % (ref 0–0)
PLATELET COUNT, AUTOMATED: 328 10^3/UL (ref 150–450)
PROTHROMBIN TIME: 13.1 SECONDS (ref 12.1–14.4)
RED BLOOD COUNT: 3.73 10^6/UL (ref 4.3–6.1)
RED CELL DISTRIBUTION WIDTH: 13 % (ref 11.5–14.5)
WHITE BLOOD COUNT: 14.8 10^3/UL (ref 4–10)

## 2018-10-02 RX ADMIN — IPRATROPIUM BROMIDE AND ALBUTEROL SULFATE 1 ML: .5; 3 SOLUTION RESPIRATORY (INHALATION) at 11:15

## 2018-10-02 RX ADMIN — DEXTROSE MONOHYDRATE 1 MLS/HR: 50 INJECTION, SOLUTION INTRAVENOUS at 02:57

## 2018-10-02 RX ADMIN — MORPHINE SULFATE 1 MG: 4 INJECTION INTRAVENOUS at 01:48

## 2018-10-02 RX ADMIN — DOCUSATE SODIUM 1 MG: 100 CAPSULE, LIQUID FILLED ORAL at 08:39

## 2018-10-02 RX ADMIN — IPRATROPIUM BROMIDE AND ALBUTEROL SULFATE 1 ML: .5; 3 SOLUTION RESPIRATORY (INHALATION) at 07:18

## 2018-10-02 RX ADMIN — IPRATROPIUM BROMIDE AND ALBUTEROL SULFATE 1 ML: .5; 3 SOLUTION RESPIRATORY (INHALATION) at 00:00

## 2018-10-02 RX ADMIN — LISINOPRIL 1 MG: 20 TABLET ORAL at 20:23

## 2018-10-02 RX ADMIN — DOCUSATE SODIUM 1 MG: 100 CAPSULE, LIQUID FILLED ORAL at 20:22

## 2018-10-02 RX ADMIN — IPRATROPIUM BROMIDE AND ALBUTEROL SULFATE 1 ML: .5; 3 SOLUTION RESPIRATORY (INHALATION) at 23:46

## 2018-10-02 RX ADMIN — MORPHINE SULFATE 1 MG: 4 INJECTION INTRAVENOUS at 06:21

## 2018-10-02 RX ADMIN — IPRATROPIUM BROMIDE AND ALBUTEROL SULFATE 1 ML: .5; 3 SOLUTION RESPIRATORY (INHALATION) at 19:26

## 2018-10-02 RX ADMIN — IPRATROPIUM BROMIDE AND ALBUTEROL SULFATE 1 ML: .5; 3 SOLUTION RESPIRATORY (INHALATION) at 15:16

## 2018-10-03 LAB
HEMATOCRIT: 40.3 % (ref 42–52)
HEMOGLOBIN: 13.3 G/DL (ref 13.5–17.5)
MAGNESIUM LEVEL: 2.3 MG/DL (ref 1.8–2.4)
MEAN CORPUSCULAR HEMOGLOBIN: 32.2 PG (ref 27–33)
MEAN CORPUSCULAR HGB CONC: 33 G/DL (ref 32–36.5)
MEAN CORPUSCULAR VOLUME: 97.6 FL (ref 80–96)
NRBC BLD AUTO-RTO: 0 % (ref 0–0)
PLATELET COUNT, AUTOMATED: 350 10^3/UL (ref 150–450)
RED BLOOD COUNT: 4.13 10^6/UL (ref 4.3–6.1)
RED CELL DISTRIBUTION WIDTH: 13.2 % (ref 11.5–14.5)
WHITE BLOOD COUNT: 11 10^3/UL (ref 4–10)

## 2018-10-03 RX ADMIN — IPRATROPIUM BROMIDE AND ALBUTEROL SULFATE 1 ML: .5; 3 SOLUTION RESPIRATORY (INHALATION) at 11:28

## 2018-10-03 RX ADMIN — IPRATROPIUM BROMIDE AND ALBUTEROL SULFATE 1 ML: .5; 3 SOLUTION RESPIRATORY (INHALATION) at 07:19

## 2018-10-03 RX ADMIN — IPRATROPIUM BROMIDE AND ALBUTEROL SULFATE 1 ML: .5; 3 SOLUTION RESPIRATORY (INHALATION) at 03:36

## 2018-10-03 RX ADMIN — DOCUSATE SODIUM 1 MG: 100 CAPSULE, LIQUID FILLED ORAL at 08:20

## 2018-10-03 RX ADMIN — DEXTROSE MONOHYDRATE 1 MLS/HR: 50 INJECTION, SOLUTION INTRAVENOUS at 00:25

## 2018-10-04 NOTE — RADONC
RADIATION ONCOLOGY PROGRESS NOTE

 

DATE OF SERVICE:  10/02/2018

 

CHART NUMBER:

.

 

DIAGNOSIS:

Right lung cancer.

 

STAGE:

IIIA, T4N1M0 versus stage IIIB, T4N2M0.

 

ECOG PERFORMANCE STATUS :

1.

 

PROGRESS NOTE:

I apparently received an official consultation request on Mr. Roach, which was

sent to the old fax machine in the [facility].

 

I am well aware of Mr. Roach, and we undertook a full history and physical

consultation on 09/14/2018.

 

Following that, the patient was brought in for CT simulation, which was

undertaken without difficulty.

 

Treatment planning for 3-D conformal therapy had also been completed and

approved.  The insurance authorizations were done.

 

The patient has been brought down today for initiation of radiation and

successfully underwent his first fraction of RT for a dose of 200 cGy.

 

The patient tolerated his first fraction without difficulty.  Radiation is

scheduled to continue on a daily basis.

 

REVIEW OF SYSTEMS:

The patient's review of systems today is unchanged.

 

PHYSICAL EXAMINATION:

The patient's skin clearly shows no evidence of radiation change present, and 
the

remainder of the physical is also unchanged.

 

Thank you for submitting a request for consultation, although I would be happy 
to

refer you to the consultation note done on 09/14/2018.  As noted above, the

patient is well-known to our department and is being seen on a daily basis.

 

If I could be of any further assistance or provide you with any information,

please feel free to contact us at any time.

 

As always, with warm regards.

 

Morales JORDAN

## 2018-10-10 NOTE — RADONC
RADIATION ONCOLOGY PROGRESS NOTE

 

DATE:  10/08/2018

 

CHART #:  

 

Mr. Roach is presently at a dose of 1000 cGy to his right lung and is tolerating

treatments quite well at this point with no complaints related to his radiation

therapy.  He is having no increased difficulty swallowing or breathing.

 

REVIEW OF SYSTEMS:

The patient's review of systems is noncontributory.  Denies nausea, vomiting,

fevers, chills, night sweats, diplopia, headaches, anxiety or depression,

anorexia, weight loss, visual disturbances, chest pain, urinary or bowel

difficulties, bone pain, or neurological problems.

 

PHYSICAL EXAMINATION:

The patient's skin is in good condition with no evidence of radiation change

present.  The remainder of his physical exam remains unchanged as well.

 

Mr. Roach is tolerating treatments quite well and radiation will continue as

scheduled.

## 2018-10-16 NOTE — RADONC
RADIATION ONCOLOGY PROGRESS NOTE

 

DATE:  10/15/2018

 

CHART NUMBER:  

 

Mr. Roach is presently at a dose of 2000 cGy to his right lung and is complaining

of pain upon swallowing that is keeping him up at night.  Other than that, he is

having no problems with his radiation.

 

The patient's review of systems is positive for pain and difficulty sleeping but

is otherwise noncontributory.  Denies nausea, vomiting, fevers, chills, night

sweats, diplopia, headaches, anxiety or depression, anorexia, weight loss, visual

disturbances, chest pain, urinary or bowel difficulties, bone pain, or

neurological problems.

 

PHYSICAL EXAMINATION

The patient's skin is in excellent condition with no evidence of moist or dry

desquamation.  The remainder of his physical exam remains unchanged.

 

Mr. Roach is tolerating treatments quite well and radiation will continue as

scheduled.  I have renewed his Percocet prescription for 5 mg / 325, I have given

him 40 pills.  The patient says he is using them just at night to sleep and this

should last him through the end of treatment.

## 2018-10-23 NOTE — RADONC
RADIATION ONCOLOGY PROGRESS NOTE

 

DATE: 10/22/2018

 

CHART NUMBER: 

 

Mr. Roach is presently at dose of 3000 cGy to his right lung and continues to

complain of pain requesting more pain pills.  He tells me he is only using one at

night, perhaps occasionally one in the morning to help him sleep.  He has no

other complaints at this time related to his radiation therapy or disease other

than some nauseousness and loss of appetite.

 

PHYSICAL EXAMINATION:

The patient's skin is in excellent condition.  His weight has actually increased

total of 3 pounds since consultation.  The remainder of his physical exam remains

unchanged.

 

I explained to the patient that I have already given him 60 Percocets in the past

2-1/2 weeks.  He therefore is taking more than just one at night.  I do not wish

him to be in pain and therefore I have renewed his Percocets and given him 60

pills.  I let him know that expected this last throughout the course of

treatment.

 

Radiation will continue as scheduled.

## 2018-10-30 NOTE — RADONC
RADIATION ONCOLOGY PROGRESS NOTE

 

DATE:  10/29/2018

 

CHART NUMBER:  

 

Mr. Roach, with a diagnosis of right lung carcinoma, is currently receiving local

regional radiotherapy and is tolerating his therapy reasonably well at a dose of

4000 cGy of an anticipated 6800 cGy.

 

REVIEW OF SYSTEMS

He denies any nausea, vomiting, but does experience some minor odynophagia and

dysphagia.  His energy level is slightly diminished but he is still able to

maintain most day-to-day activities without any alteration of his lifestyle.  He

has tried swallowing yogurt to ameliorate the minor dysphagia with no relief.

 

EXAMINATION FINDINGS:

Skin within the irradiated volume shows neither erythema nor desquamation.  There

is no palpable peripheral lymphadenopathy.

Lungs are distant bilaterally.

 

The remainder of the physical examination is unchanged.

 

IMPRESSION:

Tolerating therapy reasonably well.

 

PLAN:

Treatments to continue.

## 2018-10-31 ENCOUNTER — HOSPITAL ENCOUNTER (OUTPATIENT)
Dept: HOSPITAL 53 - M ONCR | Age: 58
End: 2018-10-31
Attending: RADIOLOGY
Payer: COMMERCIAL

## 2018-10-31 DIAGNOSIS — C34.01: Primary | ICD-10-CM

## 2018-11-01 ENCOUNTER — HOSPITAL ENCOUNTER (OUTPATIENT)
Dept: HOSPITAL 53 - M ONCR | Age: 58
LOS: 29 days | End: 2018-11-30
Attending: RADIOLOGY
Payer: COMMERCIAL

## 2018-11-01 DIAGNOSIS — C34.01: Primary | ICD-10-CM

## 2018-11-01 PROCEDURE — 77336 RADIATION PHYSICS CONSULT: CPT

## 2018-11-14 ENCOUNTER — HOSPITAL ENCOUNTER (OUTPATIENT)
Dept: HOSPITAL 53 - M RAD | Age: 58
End: 2018-11-14
Attending: NURSE PRACTITIONER
Payer: COMMERCIAL

## 2018-11-14 DIAGNOSIS — J44.9: ICD-10-CM

## 2018-11-14 DIAGNOSIS — R13.10: ICD-10-CM

## 2018-11-14 DIAGNOSIS — J43.9: ICD-10-CM

## 2018-11-14 DIAGNOSIS — C34.90: Primary | ICD-10-CM

## 2018-12-21 ENCOUNTER — HOSPITAL ENCOUNTER (OUTPATIENT)
Dept: HOSPITAL 53 - M RAD | Age: 58
End: 2018-12-21
Attending: NURSE PRACTITIONER
Payer: COMMERCIAL

## 2018-12-21 DIAGNOSIS — Z92.21: ICD-10-CM

## 2018-12-21 DIAGNOSIS — R13.10: Primary | ICD-10-CM

## 2018-12-21 DIAGNOSIS — Z85.118: ICD-10-CM

## 2018-12-21 DIAGNOSIS — R51: ICD-10-CM

## 2018-12-21 DIAGNOSIS — Z92.3: ICD-10-CM

## 2018-12-22 NOTE — REP
Esophagram:  Single contrast study.

 

History:  Odynophagia.  Dysphasia.  Status post chemo radiation therapy for lung

carcinoma.

 

Findings:  The initial PA chest x-ray shows an Nrbmjf-W-Xglm catheter in place

via the left side.  Mediastinum is not widened.  Heart size is normal.  Lung

fields are clear.  There are old healed rib fractures noted on the left.

Degenerative changes are visible in the thoracic spine.  There are clips in the

left upper quadrant of the abdomen.  Esophagram images demonstrate no oral

pharyngeal phase discoordination.  The thoracic esophagus is normal in course and

caliber and smooth in contour.  No ulceration is seen.  No stricture or mass

lesion is observed.  No extrinsic mass effect is seen.  Reflux was not witnessed

during examination.

 

Impression:

 

No morphologic abnormality.  Fluoroscopy time was 0.4 minutes.

 

 

Electronically Signed by

Delon Redman MD 12/22/2018 08:16 A

## 2019-01-23 ENCOUNTER — HOSPITAL ENCOUNTER (OUTPATIENT)
Dept: HOSPITAL 53 - M OPP | Age: 59
Discharge: HOME | End: 2019-01-23
Attending: SURGERY
Payer: COMMERCIAL

## 2019-01-23 VITALS — SYSTOLIC BLOOD PRESSURE: 124 MMHG | DIASTOLIC BLOOD PRESSURE: 72 MMHG

## 2019-01-23 VITALS — WEIGHT: 140.8 LBS | HEIGHT: 72 IN | BODY MASS INDEX: 19.07 KG/M2

## 2019-01-23 DIAGNOSIS — K20.8: Primary | ICD-10-CM

## 2019-01-23 DIAGNOSIS — F17.210: ICD-10-CM

## 2019-01-23 DIAGNOSIS — Z85.118: ICD-10-CM

## 2019-01-23 DIAGNOSIS — Z92.21: ICD-10-CM

## 2019-01-23 DIAGNOSIS — Z92.3: ICD-10-CM

## 2019-01-23 DIAGNOSIS — R13.10: ICD-10-CM

## 2019-01-23 DIAGNOSIS — R07.9: ICD-10-CM

## 2019-01-23 DIAGNOSIS — K29.50: ICD-10-CM

## 2019-01-23 NOTE — ROOR
________________________________________________________________________________

Patient Name: Mazin Roach            Procedure Date: 1/23/2019 11:22 AM

MRN: [id number]                          Account Number: [id number]

YOB: 1960                Age: 58

Room: [facility]                            Gender: Male

Note Status: Finalized                 

________________________________________________________________________________

 

Procedure:           Upper GI endoscopy

Indications:         Dysphagia

Providers:           Ryan Alfaro MD

Referring MD:        Corinne Nichols

Requesting Provider: 

Medicines:           Monitored Anesthesia Care

Complications:       No immediate complications.

________________________________________________________________________________

Procedure:           Pre-Anesthesia Assessment:

                     - Prior to the procedure, a History and Physical was 

                     performed, and patient medications and allergies were 

                     reviewed. The patient is competent. The risks and 

                     benefits of the procedure and the sedation options and 

                     risks were discussed with the patient. All questions were 

                     answered and informed consent was obtained. Patient 

                     identification and proposed procedure were verified by 

                     the physician, the nurse and the anesthetist in the 

                     endoscopy suite. Mental Status Examination: alert and 

                     oriented. Airway Examination: normal oropharyngeal airway 

                     and neck mobility. Respiratory Examination: clear to 

                     auscultation. CV Examination: normal. Prophylactic 

                     Antibiotics: The patient does not require prophylactic 

                     antibiotics. Prior Anticoagulants: The patient has taken 

                     no previous anticoagulant or antiplatelet agents. ASA 

                     Grade Assessment: III - A patient with severe systemic 

                     disease. After reviewing the risks and benefits, the 

                     patient was deemed in satisfactory condition to undergo 

                     the procedure. The anesthesia plan was to use monitored 

                     anesthesia care (MAC). Immediately prior to 

                     administration of medications, the patient was 

                     re-assessed for adequacy to receive sedatives. The heart 

                     rate, respiratory rate, oxygen saturations, blood 

                     pressure, adequacy of pulmonary ventilation, and response 

                     to care were monitored throughout the procedure. The 

                     physical status of the patient was re-assessed after the 

                     procedure.

                     The Endoscope was introduced through the mouth, and 

                     advanced to the third part of duodenum. The upper GI 

                     endoscopy was somewhat difficult due to presence of food. 

                     Successful completion of the procedure was aided by 

                     performing the maneuvers documented (below) in this 

                     report. The patient tolerated the procedure well.

                                                                                

Findings:

     There was circumferential mucosal inflammation with breakdown of the 

     normal mucosal lining, mild contact bleeding from 25 to 27 cms. There is 

     a food bolus stuck at the area above the swelling (looks old). Rest of 

     esophagus without thickening, swelling, narrowing, This was biopsied with 

     a cold forceps for histology.

     Scattered mild inflammation characterized by congestion (edema) was found 

     in the gastric antrum.

     The examined duodenum was normal.

                                                                                

Impression:          - LA Grade D erosive esophagitis. Biopsied.

                     - Chronic gastritis.

                     - Normal examined duodenum.

Recommendation:      - Use Viscous Lidocaine at 2% 5 mL PO q 6 hrs for 2 weeks.

                     - Nystatin suspension 200,000 units PO QID for 1 week.

                     - Nystatin suspension 100,000 units PO QID for 3 weeks.

                                                                                

 

Ryan Alfaro MD

_______________________

Ryan Alfaro MD

1/23/2019 11:55:53 AM

This report has been signed electronically.

Number of Addenda: 0

 

Note Initiated On: 1/23/2019 11:22 AM

Estimated Blood Loss:

     Estimated blood loss was minimal.

## 2019-01-25 ENCOUNTER — HOSPITAL ENCOUNTER (OUTPATIENT)
Dept: HOSPITAL 53 - M RAD | Age: 59
End: 2019-01-25
Attending: INTERNAL MEDICINE
Payer: COMMERCIAL

## 2019-01-25 DIAGNOSIS — C78.7: ICD-10-CM

## 2019-01-25 DIAGNOSIS — C34.90: Primary | ICD-10-CM

## 2019-01-25 PROCEDURE — 74177 CT ABD & PELVIS W/CONTRAST: CPT

## 2019-01-25 PROCEDURE — 71275 CT ANGIOGRAPHY CHEST: CPT

## 2019-01-25 NOTE — REP
CT PULMONARY ANGIOGRAM:  With IV contrast.

 

HISTORY: Non-small cell lung carcinoma.  Chest pain with shortness of breath.

Question PE versus metastasis.

 

COMPARISON STUDIES: Comparison chest CT study November 14, 2018.

 

CONTRAST DOSE:  100 mL of Isovue 370 are administered intravenously.

 

CT TECHNIQUE:  Helical scanning is acquired and overlapping 1.5 mm and contiguous

3 mm axial images are reformatted.  In addition, maximum intensity projection and

multiplanar re-formation images are generated in sagittal and coronal imaging

projections.

 

CT PULMONARY ANGIOGRAPHIC FINDINGS:  There is good opacification of the pulmonary

arterial tree.  Axial, maximal intensity projection, and multiplanar re-formation

images show no evidence of pulmonary arterial filling defect or vessel cutoff to

suggest pulmonary embolism.  The thoracic aorta enhances homogeneously and is

normal in caliber and course.  There is no evidence of aneurysm or dissection.

The patient is status post splenectomy with regenerative nodules in the left

upper quadrant adjacent to post splenectomy surgical clips as seen previously.

No adrenal gland nodule or mass is seen on either side.  There is a 9 x 16 mm

pleural-based nodular opacity in the right lower lobe of the lung visible on

axial CT image number 63 of 128 in series 402 of today's study.  This it is a new

finding.  In addition, today's study demonstrates patchy mixed interstitial and

alveolar consolidation in the right upper lobe.  This is more extensive than on

the November 14, 2018 study. It is not the typical pattern of lymphangitic

spread,  however, the fact that it is similar and more extensive than changes

seen November 14, 2018 raised the question of progression in the lung fields.

 

Also noted are two new areas of concern in the liver.  In the right lobe high

near the dome of the diaphragm, there is a 2.6 cm low density lesion and a nearby

1.6 cm low density lesion which are new from July 6, 2018 and November 14, 2018

prior studies. There is also an ill-defined low density lesion in the posterior

segment right lobe 1.5 cm in diameter.

 

IMPRESSION: No CT evidence of pulmonary embolism.  Progressive infiltrate pattern

in the right upper lobe may be neoplastic or conceivably inflammatory.  There is

a new pleural-based pulmonary nodule in the right lower lobe.  There are three

new small hypodense liver mass lesions which must be considered suspicious for

metastatic disease.

 

 

Electronically Signed by

Delon Redman MD 01/25/2019 10:41 A

## 2019-01-25 NOTE — REP
CT ABDOMEN AND PELVIS WITH IV AND ORAL CONTRAST:

 

HISTORY:  Non-small cell lung carcinoma.

 

CT CONTRAST DOSE:  100 mL  of intravenous Isovue 370.

 

Comparison CT study is from July 6, 2018.

 

FINDINGS:  There are multiple new heterogeneous peripherally enhancing hepatic

metastatic lesions.  These range in size up to 2.9 cm and are seen distributed in

the right lobe and left lobe.  No pancreatic abnormality is observed.  The

patient is status post splenectomy with regenerated splenic nodules in the left

upper quadrant.  No adrenal lesion is seen.  No renal masses observed.  The

kidneys enhance symmetrically and are morphologically intact.  No retroperitoneal

adenopathy is seen.  Small and large intestinal bowel loops are unremarkable.

Seminal vesicles prostate and urinary bladder are unremarkable.  No abdominal

wall defect is seen.  Bone window settings show no bony destructive lesion.

There is a unilateral L5 spondylolysis on the right.  Advanced degenerative

spondylosis changes are noted.

 

IMPRESSION: Multiple new liver metastases.

 

 

Electronically Signed by

Delon Redman MD 01/25/2019 10:42 A

## 2019-02-13 ENCOUNTER — HOSPITAL ENCOUNTER (OUTPATIENT)
Dept: HOSPITAL 53 - M RADPRO | Age: 59
End: 2019-02-13
Attending: INTERNAL MEDICINE
Payer: COMMERCIAL

## 2019-02-13 DIAGNOSIS — Z85.118: ICD-10-CM

## 2019-02-13 DIAGNOSIS — C22.9: Primary | ICD-10-CM

## 2019-02-13 DIAGNOSIS — Z79.899: ICD-10-CM

## 2019-02-14 NOTE — REP
ULTRASOUND-GUIDED LIVER BIOPSY

 

The procedure was performed under the direct supervision of Dr. washington.

 

The patient has a history of three small hypodense liver mass lesions seen on a

previous CT scan dated 01/25/2019.

 

The risks and benefits of the procedure were explained to the patient and

informed consent was obtained.

 

A lesion in the left lobe of the liver was localized using ultrasound guidance.

The skin was prepped and draped in a sterile fashion.  1% lidocaine was used as a

local anesthetic.  Using ultrasound guidance a 19/20 gauge coaxial needle biopsy

system was inserted and advanced into the mass.  Five core biopsy samples were

obtained and sent to lab.

 

The patient tolerated the procedure well and there were no immediate

complications.  After the appropriate amount of monitored convalescence the

patient was discharged from the department.

 

 

Reviewed by

THUY Beasley 02/13/2019 05:06 P

Electronically Signed by

Dami Washington MD 02/14/2019 09:11 A

## 2019-04-05 ENCOUNTER — HOSPITAL ENCOUNTER (OUTPATIENT)
Dept: HOSPITAL 53 - M SDC | Age: 59
Discharge: HOME | End: 2019-04-05
Attending: SURGERY
Payer: COMMERCIAL

## 2019-04-05 VITALS — BODY MASS INDEX: 19.64 KG/M2 | WEIGHT: 145 LBS | HEIGHT: 72 IN

## 2019-04-05 VITALS — DIASTOLIC BLOOD PRESSURE: 82 MMHG | SYSTOLIC BLOOD PRESSURE: 149 MMHG

## 2019-04-05 DIAGNOSIS — I10: ICD-10-CM

## 2019-04-05 DIAGNOSIS — K22.2: ICD-10-CM

## 2019-04-05 DIAGNOSIS — K31.89: ICD-10-CM

## 2019-04-05 DIAGNOSIS — F41.9: ICD-10-CM

## 2019-04-05 DIAGNOSIS — K22.10: Primary | ICD-10-CM

## 2019-04-05 DIAGNOSIS — C78.7: ICD-10-CM

## 2019-04-05 DIAGNOSIS — C34.11: ICD-10-CM

## 2019-04-05 DIAGNOSIS — F17.210: ICD-10-CM

## 2019-04-05 DIAGNOSIS — Z79.899: ICD-10-CM

## 2019-04-05 DIAGNOSIS — J44.9: ICD-10-CM

## 2019-04-05 DIAGNOSIS — Z92.21: ICD-10-CM

## 2019-04-05 DIAGNOSIS — R13.14: ICD-10-CM

## 2019-04-05 DIAGNOSIS — R07.89: ICD-10-CM

## 2019-04-05 DIAGNOSIS — Z92.3: ICD-10-CM

## 2019-04-05 NOTE — ROOR
________________________________________________________________________________

Patient Name: Mazin Roach            Procedure Date: 4/5/2019 9:42 AM

MRN: [id number]                          Account Number: [id number]

YOB: 1960                Age: 58

Room: [facility]                            Gender: Male

Note Status: Finalized                 

________________________________________________________________________________

 

Procedure:           Upper GI endoscopy

Indications:         Esophageal dysphagia, Follow-up of esophageal stricture, 

                     Chest pain (non cardiac)

Providers:           Ryan Alfaro MD

Referring MD:        Ludy Escamilla MD

Requesting Provider: 

Medicines:           Monitored Anesthesia Care

Complications:       No immediate complications.

________________________________________________________________________________

Procedure:           Pre-Anesthesia Assessment:

                     - Prior to the procedure, a History and Physical was 

                     performed, and patient medications and allergies were 

                     reviewed. The patient is competent. The risks and 

                     benefits of the procedure and the sedation options and 

                     risks were discussed with the patient. All questions were 

                     answered and informed consent was obtained. Patient 

                     identification and proposed procedure were verified by 

                     the physician, the nurse and the anesthetist in the 

                     endoscopy suite. Mental Status Examination: alert and 

                     oriented. Airway Examination: normal oropharyngeal airway 

                     and neck mobility. Respiratory Examination: clear to 

                     auscultation. CV Examination: normal. Prophylactic 

                     Antibiotics: The patient does not require prophylactic 

                     antibiotics. Prior Anticoagulants: The patient has taken 

                     no previous anticoagulant or antiplatelet agents. ASA 

                     Grade Assessment: III - A patient with severe systemic 

                     disease. After reviewing the risks and benefits, the 

                     patient was deemed in satisfactory condition to undergo 

                     the procedure. The anesthesia plan was to use monitored 

                     anesthesia care (MAC). Immediately prior to 

                     administration of medications, the patient was 

                     re-assessed for adequacy to receive sedatives. The heart 

                     rate, respiratory rate, oxygen saturations, blood 

                     pressure, adequacy of pulmonary ventilation, and response 

                     to care were monitored throughout the procedure. The 

                     physical status of the patient was re-assessed after the 

                     procedure.

                     The Endoscope was introduced through the mouth, and 

                     advanced to the second part of duodenum. The upper GI 

                     endoscopy was accomplished without difficulty. The 

                     patient tolerated the procedure well.

                                                                                

Findings:

     Two cratered esophageal ulcers with no bleeding and no stigmata of recent 

     bleeding were found 30 to 32 cm from the incisors. The largest lesion was 

     5 mm in largest dimension. Estimated blood loss: none.

     Scattered minimal inflammation characterized by granularity was found in 

     the gastric antrum.

     The ampulla, duodenal bulb, first portion of the duodenum and second 

     portion of the duodenum were normal.

     A small hiatal hernia was present.

                                                                                

Impression:          - Non-bleeding esophageal ulcers.

                     - Mucosal changes suspicious for gastritis.

                     - Normal ampulla, duodenal bulb, first portion of the 

                     duodenum and second portion of the duodenum.

                     - No specimens collected.

                     - Previous area of inflammation seems improved, I was 

                     able to get through the area without difficulty; thus I 

                     did not need to dilate it. there are 2 chronic appearing 

                     ulcers on the wall of the esophagus with whitich 

                     plaque/fibrin covering it and slight friability of 

                     tissues around it but no active bleeding. Beyond the 

                     area, appears normal esophagus, prior to this area 

                     esophagus likewise is normal.

Recommendation:      - Use sucralfate suspension 1 gram PO BID indefinitely.

                                                                                

 

Ryan Alfaro MD

_______________________

Ryan Alfaro MD

4/5/2019 10:04:19 AM

Electronically signed by Ryan Alfaro MD

Number of Addenda: 0

 

Note Initiated On: 4/5/2019 9:42 AM

Estimated Blood Loss:

     Estimated blood loss: none.

## 2019-06-03 ENCOUNTER — HOSPITAL ENCOUNTER (OUTPATIENT)
Dept: HOSPITAL 53 - M RAD | Age: 59
End: 2019-06-03
Attending: INTERNAL MEDICINE
Payer: COMMERCIAL

## 2019-06-03 DIAGNOSIS — C78.7: ICD-10-CM

## 2019-06-03 DIAGNOSIS — C34.90: Primary | ICD-10-CM

## 2019-06-03 DIAGNOSIS — J43.9: ICD-10-CM

## 2019-06-03 PROCEDURE — 74177 CT ABD & PELVIS W/CONTRAST: CPT

## 2019-06-03 PROCEDURE — 71260 CT THORAX DX C+: CPT

## 2019-06-05 NOTE — REP
Clinical:  History of lung cancer for restaging.

 

Technique:  Axial contrast enhanced images from the thoracic inlet to the upper

abdomen with coronal and sagittal re-formations using 100 ml Isovue 370

intravenous contrast material.

 

Comparison:  01/25/2019, 11/14/2018

 

Findings:

Somewhat triangular area of consolidation/mass identified in the right upper lung

zone measuring roughly 4.5 x 2.9 cm extending from the right hilum to the

subpleural space with surrounding areas of smaller patchy infiltrates and

loculated apical effusion.  A smaller area of atelectasis/consolidation and

pleural reaction is also identified along the posterior right mid lung zone.

These findings represent new areas of disease and may reflect recurrent

malignancy.  Scattered chronic interstitial changes as well as scattered

bilateral bullae are again noted and essentially unchanged.  No acute process

identified involving the left hemithorax.  No significant mediastinal or hilar

adenopathy noted.

 

Further evaluation of the mediastinum demonstrates normal thoracic aorta,

pulmonary vasculature and heart/pericardium.  Atherosclerotic changes to the

coronary arteries cannot be excluded.  No pericardial effusion.  Osseous

structures demonstrate degenerative changes without focal abnormality.

 

Impression:

1.  New areas of consolidation/mass and small suspected loculated pleural

effusion and pleural reaction in the right mid to upper lung zone are concerning

for scattered pneumonia versus recurrence.

 

 

Electronically Signed by

Antonio Sofia MD 06/05/2019 09:02 A

## 2019-06-05 NOTE — REP
Clinical:  History of lung cancer for restaging.

 

Technique:  Axial contrast enhanced images from the lung bases to the pubic

symphysis with coronal and sagittal re-formations using oral (per protocol) 100

ml Isovue 370 intravenous contrast material.

 

Comparison:  01/25/2019

 

Findings:

Previously identified liver metastases appear to have decreased in size and no

new lesions are identified on current examination.

 

Splenules in the left upper quadrant remain stable.  Pancreas, gallbladder,

bilateral adrenal glands and kidneys are normal.  The enteric system is without

obstruction or acute inflammatory process.  Evaluation of the pelvis demonstrates

normal bladder and age appropriate prostate/seminal vesicles.  No ascites.  No

free air.  No significant intraperitoneal or retroperitoneal adenopathy noted.

Atherosclerotic changes to the aorta and vasculature without aneurysm or

dissection.  Musculoskeletal structures demonstrate degenerative changes without

focal osseous abnormality.

 

Impression:

1.  Previously identified liver metastases appear to be decreased in size and no

new lesions identified.

2.  No further acute abdominopelvic pathology appreciated.  Specifically, no

ascites, focal inflammatory stranding, or adenopathy is appreciated.

 

 

Electronically Signed by

Antonio Sofia MD 06/05/2019 09:07 A

## 2019-09-10 ENCOUNTER — HOSPITAL ENCOUNTER (EMERGENCY)
Dept: HOSPITAL 53 - M ED | Age: 59
Discharge: HOME | End: 2019-09-10
Payer: COMMERCIAL

## 2019-09-10 VITALS — DIASTOLIC BLOOD PRESSURE: 72 MMHG | SYSTOLIC BLOOD PRESSURE: 115 MMHG

## 2019-09-10 VITALS — DIASTOLIC BLOOD PRESSURE: 76 MMHG | SYSTOLIC BLOOD PRESSURE: 111 MMHG

## 2019-09-10 DIAGNOSIS — I48.91: Primary | ICD-10-CM

## 2019-09-10 DIAGNOSIS — Z79.899: ICD-10-CM

## 2019-09-10 DIAGNOSIS — Z85.118: ICD-10-CM

## 2019-09-10 DIAGNOSIS — I10: ICD-10-CM

## 2019-09-10 LAB
ALBUMIN SERPL BCG-MCNC: 2.2 GM/DL (ref 3.2–5.2)
ALT SERPL W P-5'-P-CCNC: 14 U/L (ref 12–78)
BASOPHILS # BLD AUTO: 0 10^3/UL (ref 0–0.2)
BASOPHILS NFR BLD AUTO: 0.1 % (ref 0–1)
BILIRUB CONJ SERPL-MCNC: 0.5 MG/DL (ref 0–0.2)
BILIRUB SERPL-MCNC: 0.9 MG/DL (ref 0.2–1)
BUN SERPL-MCNC: 9 MG/DL (ref 7–18)
CALCIUM SERPL-MCNC: 9 MG/DL (ref 8.8–10.2)
CHLORIDE SERPL-SCNC: 95 MEQ/L (ref 98–107)
CK MB CFR.DF SERPL CALC: 4.29
CK MB SERPL-MCNC: 1.2 NG/ML (ref ?–3.6)
CK SERPL-CCNC: 28 U/L (ref 39–308)
CO2 SERPL-SCNC: 27 MEQ/L (ref 21–32)
CREAT SERPL-MCNC: 0.44 MG/DL (ref 0.7–1.3)
EOSINOPHIL # BLD AUTO: 0 10^3/UL (ref 0–0.5)
EOSINOPHIL NFR BLD AUTO: 0.1 % (ref 0–3)
GFR SERPL CREATININE-BSD FRML MDRD: > 60 ML/MIN/{1.73_M2} (ref 49–?)
GLUCOSE SERPL-MCNC: 94 MG/DL (ref 70–100)
HCT VFR BLD AUTO: 34.6 % (ref 42–52)
HGB BLD-MCNC: 11.9 G/DL (ref 13.5–17.5)
INR PPP: 1.22
LYMPHOCYTES # BLD AUTO: 0.4 10^3/UL (ref 1.5–5)
LYMPHOCYTES NFR BLD AUTO: 2.9 % (ref 24–44)
MCH RBC QN AUTO: 31.9 PG (ref 27–33)
MCHC RBC AUTO-ENTMCNC: 34.4 G/DL (ref 32–36.5)
MCV RBC AUTO: 92.8 FL (ref 80–96)
MONOCYTES # BLD AUTO: 1.4 10^3/UL (ref 0–0.8)
MONOCYTES NFR BLD AUTO: 10.1 % (ref 0–5)
NEUTROPHILS # BLD AUTO: 12 10^3/UL (ref 1.5–8.5)
NEUTROPHILS NFR BLD AUTO: 86.2 % (ref 36–66)
PLATELET # BLD AUTO: 245 10^3/UL (ref 150–450)
POTASSIUM SERPL-SCNC: 4.7 MEQ/L (ref 3.5–5.1)
PROT SERPL-MCNC: 6.2 GM/DL (ref 6.4–8.2)
PROTHROMBIN TIME: 15.1 SECONDS (ref 11.8–14)
RBC # BLD AUTO: 3.73 10^6/UL (ref 4.3–6.1)
SODIUM SERPL-SCNC: 133 MEQ/L (ref 136–145)
T4 FREE SERPL-MCNC: 1.51 NG/DL (ref 0.76–1.46)
TROPONIN I SERPL-MCNC: 0.07 NG/ML (ref ?–0.1)
TSH SERPL DL<=0.005 MIU/L-ACNC: 2.15 UIU/ML (ref 0.36–3.74)
WBC # BLD AUTO: 13.9 10^3/UL (ref 4–10)

## 2019-09-10 RX ADMIN — METOPROLOL TARTRATE SCH MG: 5 INJECTION INTRAVENOUS at 18:34

## 2019-09-10 RX ADMIN — METOPROLOL TARTRATE SCH MG: 5 INJECTION INTRAVENOUS at 19:07

## 2019-09-10 RX ADMIN — METOPROLOL TARTRATE SCH MG: 5 INJECTION INTRAVENOUS at 18:46

## 2019-09-11 NOTE — REP
CHEST, TWO VIEWS:

 

Two views of the chest are performed.

 

Comparison made with prior studies , most recent chest radiograph 10/01/2018 and

CT 06/03/2019.

 

There is consolidated opacity in the right upper lobe, which appears increased

since these prior exams.  No new infiltrate is seen on the left.  The heart is

not significantly enlarged.  There is a left central venous catheter with the tip

in the superior vena cava.   There appears to be a small amount of right pleural

fluid or thickening.  There are mild degenerative changes of the spine.

 

IMPRESSION:

 

Increased consolidative opacity right upper lobe.  Small amount of right pleural

fluid or thickening.

 

 

Electronically Signed by

Dami Washington MD 09/12/2019 07:59 A

## 2019-09-13 NOTE — ECGEPIP
[facility] - ED

                                       

                                       Test Date:    2019-09-10

Pat Name:     CONNOR FARIAS            Department:   

Patient ID:   [id number]                 Room:         -

Gender:       Male                     Technician:   blanca

:          1960               Requested By: CLAUS SALVADOR

Order Number: [id number]     Reading MD:   Win Tesfaye

                                 Measurements

Intervals                              Axis          

Rate:         143                      P:            

AK:           0                        QRS:          202

QRSD:         85                       T:            32

QT:           258                                    

QTc:          398                                    

                           Interpretive Statements

ATRIAL FIBRILLATION/FLUTTER WITH RAPID VENTRICULAR RESPONSE

POSSIBLE ANTERIOR MYOCARDIAL INFARCTION, OF INDETERMINATE AGE

RHYTHM/RATE CHANGE COMPARED TO 18

Electronically Signed on 2019 5:43:01 EDT by Win Tesfaye

## 2019-09-17 NOTE — ED PDOC
Post-Departure Follow-Up


dr hutchinson and [facility] faxed formal reprot of cxr for fu mlg Lundborg-Gray,Maja MD          Sep 17, 2019 12:47